# Patient Record
Sex: FEMALE | Race: WHITE
[De-identification: names, ages, dates, MRNs, and addresses within clinical notes are randomized per-mention and may not be internally consistent; named-entity substitution may affect disease eponyms.]

---

## 2021-03-24 ENCOUNTER — HOSPITAL ENCOUNTER (EMERGENCY)
Dept: HOSPITAL 53 - M ED | Age: 21
Discharge: HOME | End: 2021-03-24
Payer: COMMERCIAL

## 2021-03-24 VITALS — BODY MASS INDEX: 19.23 KG/M2 | HEIGHT: 62 IN | WEIGHT: 104.5 LBS

## 2021-03-24 VITALS — SYSTOLIC BLOOD PRESSURE: 99 MMHG | DIASTOLIC BLOOD PRESSURE: 55 MMHG

## 2021-03-24 DIAGNOSIS — T50.Z95A: ICD-10-CM

## 2021-03-24 DIAGNOSIS — R06.02: Primary | ICD-10-CM

## 2021-03-24 DIAGNOSIS — R51.9: ICD-10-CM

## 2021-03-24 LAB
BASOPHILS # BLD AUTO: 0.1 10^3/UL (ref 0–0.2)
BASOPHILS NFR BLD AUTO: 0.6 % (ref 0–1)
EOSINOPHIL # BLD AUTO: 0.1 10^3/UL (ref 0–0.5)
EOSINOPHIL NFR BLD AUTO: 1.7 % (ref 0–3)
HCT VFR BLD AUTO: 39.1 % (ref 36–47)
HGB BLD-MCNC: 12.9 G/DL (ref 12–15.5)
LYMPHOCYTES # BLD AUTO: 1 10^3/UL (ref 1.5–5)
LYMPHOCYTES NFR BLD AUTO: 12.8 % (ref 24–44)
MCH RBC QN AUTO: 30.4 PG (ref 27–33)
MCHC RBC AUTO-ENTMCNC: 33 G/DL (ref 32–36.5)
MCV RBC AUTO: 92 FL (ref 80–96)
MONOCYTES # BLD AUTO: 0.5 10^3/UL (ref 0–0.8)
MONOCYTES NFR BLD AUTO: 5.9 % (ref 2–8)
NEUTROPHILS # BLD AUTO: 6.3 10^3/UL (ref 1.5–8.5)
NEUTROPHILS NFR BLD AUTO: 78.6 % (ref 36–66)
PLATELET # BLD AUTO: 185 10^3/UL (ref 150–450)
RBC # BLD AUTO: 4.25 10^6/UL (ref 4–5.4)
WBC # BLD AUTO: 8 10^3/UL (ref 4–10)

## 2021-03-24 PROCEDURE — 71046 X-RAY EXAM CHEST 2 VIEWS: CPT

## 2021-03-24 PROCEDURE — 96375 TX/PRO/DX INJ NEW DRUG ADDON: CPT

## 2021-03-24 PROCEDURE — 99284 EMERGENCY DEPT VISIT MOD MDM: CPT

## 2021-03-24 PROCEDURE — 85379 FIBRIN DEGRADATION QUANT: CPT

## 2021-03-24 PROCEDURE — 85025 COMPLETE CBC W/AUTO DIFF WBC: CPT

## 2021-03-24 PROCEDURE — 82550 ASSAY OF CK (CPK): CPT

## 2021-03-24 PROCEDURE — 96361 HYDRATE IV INFUSION ADD-ON: CPT

## 2021-03-24 PROCEDURE — 96374 THER/PROPH/DIAG INJ IV PUSH: CPT

## 2021-03-24 PROCEDURE — 93005 ELECTROCARDIOGRAM TRACING: CPT

## 2021-03-24 PROCEDURE — 80047 BASIC METABLC PNL IONIZED CA: CPT

## 2021-03-24 NOTE — ECGEPIP
[facility] - ED

                                       

                                       Test Date:    2021

Pat Name:     ABE GEIGER             Department:   

Patient ID:   [id number]                 Room:         -

Gender:       Female                   Technician:   JANEE

:          2000               Requested By: OSKAR ESPINOZA PA-C

Order Number: [id number]     Reading MD:   Vasu Bone

                                 Measurements

Intervals                              Axis          

Rate:         76                       P:            82

CT:           126                      QRS:          86

QRSD:         82                       T:            50

QT:           370                                    

QTc:          416                                    

                           Interpretive Statements

Normal sinus rhythm with sinus arrhythmia

NO PRIORS FOR COMPARISON

Electronically Signed on 3- 8:04:25 EDT by Vasu Bone

## 2021-03-24 NOTE — REPVR
PROCEDURE INFORMATION: 

Exam: XR Chest 

Exam date and time: 3/24/2021 1:12 AM 

Age: 20 years old 

Clinical indication: Shortness of breath; Additional info: MK, macie 



TECHNIQUE: 

Imaging protocol: XR of the chest 

Views: 2 views. 



COMPARISON: 

No relevant prior studies available. 



FINDINGS: 

Lungs: Unremarkable. No consolidation. 

Pleural spaces: Unremarkable. No pleural effusion. No pneumothorax. 

Heart/Mediastinum: Unremarkable. No cardiomegaly. 

Bones/joints: Unremarkable. 



IMPRESSION: 

No acute findings. 



Electronically signed by: Bird Varela On 03/24/2021  01:58:17 AM

## 2022-08-15 ENCOUNTER — HOSPITAL ENCOUNTER (EMERGENCY)
Dept: HOSPITAL 53 - M ED | Age: 22
Discharge: HOME | End: 2022-08-15
Payer: COMMERCIAL

## 2022-08-15 VITALS — BODY MASS INDEX: 18.94 KG/M2 | WEIGHT: 100.31 LBS | HEIGHT: 61 IN

## 2022-08-15 VITALS — DIASTOLIC BLOOD PRESSURE: 50 MMHG | SYSTOLIC BLOOD PRESSURE: 109 MMHG

## 2022-08-15 DIAGNOSIS — N92.0: ICD-10-CM

## 2022-08-15 DIAGNOSIS — N94.6: Primary | ICD-10-CM

## 2022-08-15 DIAGNOSIS — R51.9: ICD-10-CM

## 2022-08-15 DIAGNOSIS — J02.9: ICD-10-CM

## 2022-08-15 LAB
APPEARANCE UR: CLEAR
BACTERIA URNS QL MICRO: (no result)
BASOPHILS # BLD AUTO: 0.1 10^3/UL (ref 0–0.2)
BASOPHILS NFR BLD AUTO: 0.9 % (ref 0–1)
BILIRUB UR QL STRIP: NEGATIVE
COLOR UR: (no result)
EOSINOPHIL # BLD AUTO: 0.3 10^3/UL (ref 0–0.5)
EOSINOPHIL NFR BLD AUTO: 3.8 % (ref 0–3)
GLUCOSE UR STRIP-MCNC: NEGATIVE MG/DL
HCT VFR BLD AUTO: 39.2 % (ref 36–47)
HGB BLD-MCNC: 12.9 G/DL (ref 12–15.5)
HGB UR QL STRIP: POSITIVE
HYALINE CASTS URNS QL MICRO: (no result) /LPF (ref 0–1)
KETONES UR QL STRIP: NEGATIVE MG/DL
LEUKOCYTE ESTERASE UR QL STRIP: (no result)
LYMPHOCYTES # BLD AUTO: 2 10^3/UL (ref 1.5–5)
LYMPHOCYTES NFR BLD AUTO: 25.7 % (ref 24–44)
MCH RBC QN AUTO: 30.4 PG (ref 27–33)
MCHC RBC AUTO-ENTMCNC: 32.9 G/DL (ref 32–36.5)
MCV RBC AUTO: 92.2 FL (ref 80–96)
MONOCYTES # BLD AUTO: 0.8 10^3/UL (ref 0–0.8)
MONOCYTES NFR BLD AUTO: 9.7 % (ref 2–8)
MUCOUS THREADS URNS QL MICRO: (no result)
NEUTROPHILS # BLD AUTO: 4.6 10^3/UL (ref 1.5–8.5)
NEUTROPHILS NFR BLD AUTO: 59.6 % (ref 36–66)
NITRITE UR QL STRIP: NEGATIVE
PH UR STRIP: 7 UNITS (ref 5–7)
PLATELET # BLD AUTO: 215 10^3/UL (ref 150–450)
PROT UR STRIP-MCNC: (no result) MG/DL
RBC # BLD AUTO: 4.25 10^6/UL (ref 4–5.4)
RBC #/AREA URNS HPF: (no result) /HPF (ref 0–3)
SP GR UR STRIP: 1.01 (ref 1–1.03)
SQUAMOUS URNS QL MICRO: (no result) /HPF
UROBILINOGEN UR QL STRIP: NORMAL MG/DL
WBC # BLD AUTO: 7.7 10^3/UL (ref 4–10)
WBC #/AREA URNS HPF: (no result) /HPF (ref 0–3)

## 2023-02-03 ENCOUNTER — HOSPITAL ENCOUNTER (OUTPATIENT)
Dept: HOSPITAL 53 - M PLARAD | Age: 23
End: 2023-02-03
Attending: PHYSICIAN ASSISTANT
Payer: COMMERCIAL

## 2023-02-03 DIAGNOSIS — M54.50: Primary | ICD-10-CM
